# Patient Record
Sex: FEMALE | Race: BLACK OR AFRICAN AMERICAN | Employment: FULL TIME | URBAN - METROPOLITAN AREA
[De-identification: names, ages, dates, MRNs, and addresses within clinical notes are randomized per-mention and may not be internally consistent; named-entity substitution may affect disease eponyms.]

---

## 2018-01-23 PROBLEM — N80.9 ENDOMETRIOSIS DETERMINED BY LAPAROSCOPY: Status: ACTIVE | Noted: 2018-01-23

## 2021-06-16 ENCOUNTER — HOSPITAL ENCOUNTER (OUTPATIENT)
Dept: GENERAL RADIOLOGY | Age: 31
Discharge: HOME | End: 2021-06-16
Payer: OTHER GOVERNMENT

## 2021-06-16 DIAGNOSIS — R52 PAIN: ICD-10-CM

## 2021-06-16 PROCEDURE — 73562 X-RAY EXAM OF KNEE 3: CPT

## 2022-12-06 ENCOUNTER — TRANSCRIBE ORDER (OUTPATIENT)
Dept: REGISTRATION | Age: 32
End: 2022-12-06

## 2022-12-06 ENCOUNTER — HOSPITAL ENCOUNTER (OUTPATIENT)
Dept: GENERAL RADIOLOGY | Age: 32
Discharge: HOME | End: 2022-12-06
Payer: OTHER GOVERNMENT

## 2022-12-06 DIAGNOSIS — M25.521 RIGHT ELBOW PAIN: ICD-10-CM

## 2022-12-06 DIAGNOSIS — M25.521 RIGHT ELBOW PAIN: Primary | ICD-10-CM

## 2022-12-06 PROCEDURE — 73080 X-RAY EXAM OF ELBOW: CPT

## 2022-12-12 ENCOUNTER — HOSPITAL ENCOUNTER (OUTPATIENT)
Dept: PHYSICAL THERAPY | Age: 32
Discharge: HOME | End: 2022-12-12
Payer: OTHER GOVERNMENT

## 2022-12-12 PROCEDURE — 97161 PT EVAL LOW COMPLEX 20 MIN: CPT

## 2022-12-12 PROCEDURE — 97110 THERAPEUTIC EXERCISES: CPT

## 2022-12-12 NOTE — PROGRESS NOTES
PT DAILY TREATMENT NOTE - [facility]     Patient Name: Maryam Roth  Date:2022  : 1990  [x]  Patient  Verified  Payor:  / Plan: Kaylyn Palmer / Product Type:  /    In time:333  Out time:418  Total Treatment Time (min): 39  TVisit #: 1 of     Treatment Area: Pain in right elbow [M25.521]    SUBJECTIVE  Pain Level (0-10 scale): 6-7/10  Any medication changes, allergies to medications, adverse drug reactions, diagnosis change, or new procedure performed?: [x] No    [] Yes (see summary sheet for update)  Subjective functional status/changes:   [] No changes reported  See eval    OBJECTIVE    Modality rationale: decrease pain and increase tissue extensibility to improve the patients ability to ease with adl's   Min Type Additional Details    [] Estim:  []Unatt       []IFC  []Premod                        []Other:  []w/ice   []w/heat  Position:  Location:    [] Estim: []Att    []TENS instruct  []NMES                    []Other:  []w/US   []w/ice   []w/heat  Position:  Location:    []  Traction: [] Cervical       []Lumbar                       [] Prone          []Supine                       []Intermittent   []Continuous Lbs:  [] before manual  [] after manual    []  Ultrasound: []Continuous   [] Pulsed                           []1MHz   []3MHz W/cm2:  Location:    []  Iontophoresis with dexamethasone         Location: [] Take home patch   [] In clinic    []  Ice     []  heat  []  Ice massage  []  Laser   []  Anodyne Position:  Location:    []  Laser with stim  []  Other:  Position:  Location:    []  Vasopneumatic Device Pressure:       [] lo [] med [] hi   Temperature: [] lo [] med [] hi   [] Skin assessment post-treatment:  []intact []redness- no adverse reaction    []redness - adverse reaction:     15 min []Eval                  []Re-Eval       25 min Therapeutic Exercise:  [] See flow sheet :   Rationale: increase ROM and increase strength to improve the patients ability to ease with adl's          With   [] TE   [] TA   [] neuro   [] other: Patient Education: [x] Review HEP    [] Progressed/Changed HEP based on:   [] positioning   [] body mechanics   [] transfers   [] heat/ice application    [] other:      Other Objective/Functional Measures: see eval     Pain Level (0-10 scale) post treatment: 6-7/10    ASSESSMENT/Changes in Function: see poc    Patient will continue to benefit from skilled PT services to modify and progress therapeutic interventions, address functional mobility deficits, address ROM deficits, address strength deficits, analyze and address soft tissue restrictions, analyze and cue movement patterns, analyze and modify body mechanics/ergonomics, assess and modify postural abnormalities, and address imbalance/dizziness to attain remaining goals.      [x]  See Plan of Care  []  See progress note/recertification  []  See Discharge Summary         Progress towards goals / Updated goals:  See poc    PLAN    [x]Upgrade activities as tolerated     [x]  Continue plan of care  []  Update interventions per flow sheet       []  Discharge due to:_  []  Other:_      Alena Bowers PT 12/12/2022  2:40 PM    Future Appointments   Date Time Provider Thea Simmons   12/12/2022  3:30 PM Saeid White PT [facility]

## 2022-12-12 NOTE — THERAPY EVALUATION
[facility] [place] [employer]  [address] [place] [address]  [phone] fax    Plan of Care/ Statement of Necessity for Physical Therapy Services    Patient name: Carrol Ferrer Start of Care: 2022   Referral source: Ruchi Cox MD : 1990    Medical Diagnosis: Pain in right elbow [M25.521]  Payor:  / Plan: Ramon Hanks 74 / Product Type:  /  Onset Date:2022    Treatment Diagnosis: Right elbow pain   Prior Hospitalization: see medical history Provider#: [id number]   Medications: Verified on Patient summary List    Comorbidities: Depression   Prior Level of Function: Works in [employer]. Likes to work out and lift weights at gym. The Plan of Care and following information is based on the information from the initial evaluation. Assessment/ key information: Pt report insidious onset of right elbow pain and numbness of 4th and 5th digits, 2022. She had little relief with Medrol Dose pack. Pain today is 6-7/10 and is aggravated with certain movements and includes numbness into 4th and 5th digits. Elbow and wrist ROM is WNL. Pt reports pain along the Cubital Tunnel and into flexor tendons. Elbow extension wrist flexion increased pain. Most right UE activity aggravates her elbow especially when fully flexed as well. Sx are associated with Cubital Tunnel syndrome. Pt is recommended to hold on lifting free weights at gym until sx subside. Pt will benefit from  skilled PT to improve sx to return to PLOF and improve QOL.      Evaluation Complexity History LOW Complexity : Zero comorbidities / personal factors that will impact the outcome / POC; Examination LOW Complexity : 1-2 Standardized tests and measures addressing body structure, function, activity limitation and / or participation in recreation  ;Presentation LOW Complexity : Stable, uncomplicated  ;Clinical Decision Making MEDIUM Complexity : FOTO score of 26-74  Overall Complexity Rating: LOW   Problem List: pain affecting function, decrease ROM, decrease strength, decrease ADL/ functional abilitiies, decrease activity tolerance, and decrease flexibility/ joint mobility   Treatment Plan may include any combination of the following: Therapeutic exercise, Neuromuscular reeducation, Manual therapy, Therapeutic activity, Self care/home management, Vasopneumatic device, and Ultrasound  Patient / Family readiness to learn indicated by: asking questions, trying to perform skills, and interest  Persons(s) to be included in education: patient (P)  Barriers to Learning/Limitations: None  Patient Goal (s): To figure out what the issue is  Patient Self Reported Health Status: fair  Rehabilitation Potential: good    Short Term Goals: To be accomplished in 1 weeks:   Goal:Pt will be compliant with a HEP to improve elbow function  Status at evaluation/last progress note:  Long Term Goals: To be accomplished in 4 weeks:  Goal:Pt will increase FOTO scr by 7   pts to improve function  Status at evaluation/last progress note:NA at Eval    2. Goal:Pt will report pain decreased to <4/10 to ease with driving activities. Status at evaluation/last progress note:Pain increases with driving and is at 6-5/42     3. Goal: Pt will report >70% improvement in sx to be able to gradually return to gym activities. Status at evaluation/last progress note:0% currently. Frequency / Duration: Patient to be seen 2-3 times per week for 4 weeks. Patient/  Caregiver education and instruction: Diagnosis, prognosis, self care, activity modification, and exercises   [x]  Plan of care has been reviewed with SONDRA Sims, PT 12/12/2022 3:36 PM    ________________________________________________________________________    I certify that the above Therapy Services are being furnished while the patient is under my care.  I agree with the treatment plan and certify that this therapy is necessary.     [facility] Signature:____________Date:_________TIME:________     Cheyenne Vega MD  ** Signature, Date and Time must be completed for valid certification **    Please sign and return to [facility] - [facility]ord[facility]  [address] [place] [address]  [phone] fax

## 2022-12-20 ENCOUNTER — HOSPITAL ENCOUNTER (OUTPATIENT)
Dept: PHYSICAL THERAPY | Age: 32
Discharge: HOME | End: 2022-12-20
Payer: OTHER GOVERNMENT

## 2022-12-20 PROCEDURE — 97110 THERAPEUTIC EXERCISES: CPT

## 2022-12-20 PROCEDURE — 97530 THERAPEUTIC ACTIVITIES: CPT

## 2022-12-20 NOTE — PROGRESS NOTES
PT DAILY TREATMENT NOTE     Patient Name: Dian Helton  Date:2022  : 1990  [x]  Patient  Verified  Payor: MIKEY / Plan: Ramon Hanks 74 / Product Type:  /    In time:4:03P  Out time:4:38P  Total Treatment Time (min): 35  Visit #: 2 of 12      Treatment Area: Pain in right elbow [M25.521]    SUBJECTIVE  Pain Level (0-10 scale): 0/10 pain, 7/10 numbness  Any medication changes, allergies to medications, adverse drug reactions, diagnosis change, or new procedure performed?: [x] No    [] Yes (see summary sheet for update)  Subjective functional status/changes:   [] No changes reported  Patient reports continued intermittent numbness and pain to right UE; pain and numbness are about 50/50. She has a referral for an x-ray; she may go tomorrow. Sometimes the home exercises hurt and sometimes they don't. Most of the time she has symptoms from the elbow down but sometimes she will have shooting pain from the neck down.      OBJECTIVE        12 min Therapeutic Exercise:  [] See flow sheet :   Rationale: increase ROM, increase strength, and increase proprioception to improve the patients ability to perform ADLs with increased ease    23 min Therapeutic Activity:  []  See flow sheet : FOTO, patient education, special tests   Rationale: increase ROM, increase strength, and increase proprioception  to improve the patients ability to perform ADLs with increased ease             With   [] TE   [] TA   [] neuro   [] other: Patient Education: [x] Review HEP    [] Progressed/Changed HEP based on:   [] positioning   [] body mechanics   [] transfers   [] heat/ice application    [x] other: anatomy and physiology as it relates to current condition; centralization as sign of improvement even symptoms intensify while centralizing; importance of posture to reduce muscle tightness and nerve compression     Other Objective/Functional Measures:     Pain with forward direction on UBE; soreness reported with reverse direction  FOTO: 50/100  Discomfort to right elbow with \"Urmila Basilio\" ulnar nerve glide   Spurling test positive on right; no change in symptoms with distraction    Pain Level (0-10 scale) post treatment: 6/10 numbness to palm    ASSESSMENT/Changes in Function: Initiated activities per flow sheet/POC. Patient with fair overall tolerance to session. She reports continued anterior and posterior elbow pain with intermittent numbness/tingling in ulnar nerve distribution. Positive Spurling's test likely indicating cervical involvement but centralization of symptoms with ulnar nerve glide. Patient will continue to benefit from skilled PT services to modify and progress therapeutic interventions, address functional mobility deficits, address ROM deficits, address strength deficits, analyze and address soft tissue restrictions, analyze and cue movement patterns, analyze and modify body mechanics/ergonomics, assess and modify postural abnormalities, address imbalance/dizziness, and instruct in home and community integration to attain remaining goals. Progress towards goals / Updated goals:  Short Term Goals: To be accomplished in 1 weeks:               Goal:Pt will be compliant with a HEP to improve elbow function  Status at evaluation/last progress note:  Long Term Goals: To be accomplished in 4 weeks:  Goal:Pt will increase FOTO scr by 7   pts to improve function  Status at evaluation/last progress note:NA at Eval     2. Goal:Pt will report pain decreased to <4/10 to ease with driving activities. Status at evaluation/last progress note:Pain increases with driving and is at 2-6/42     3. Goal: Pt will report >70% improvement in sx to be able to gradually return to gym activities. Status at evaluation/last progress note:0% currently.                        PLAN  [x]  Upgrade activities as tolerated     [x]  Continue plan of care  []  Update interventions per flow sheet       [] Discharge due to:_  []  Other:_      Audra Wells, PT 12/20/2022  10:23 AM    Future Appointments   Date Time Provider Thea Simmons   12/20/2022  4:00 PM Rommel Greene [id number]   12/22/2022  3:30 PM Rommel Greene [id number]   12/28/2022  4:00 PM Rommel Greene [id number]

## 2022-12-22 ENCOUNTER — HOSPITAL ENCOUNTER (OUTPATIENT)
Dept: PHYSICAL THERAPY | Age: 32
Discharge: HOME | End: 2022-12-22
Payer: OTHER GOVERNMENT

## 2022-12-22 PROCEDURE — 97110 THERAPEUTIC EXERCISES: CPT

## 2022-12-22 PROCEDURE — 97112 NEUROMUSCULAR REEDUCATION: CPT

## 2022-12-22 NOTE — PROGRESS NOTES
PT DAILY TREATMENT NOTE     Patient Name: Mor Vale  Date:2022  : 1990  [x]  Patient  Verified  Payor: MIKEY / Plan: Ramon Hanks 74 / Product Type:  /    In time:3:30P  Out time:4:06P  Total Treatment Time (min): 36  Visit #: 3 of 12      Treatment Area: Pain in right elbow [M25.521]    SUBJECTIVE  Pain Level (0-10 scale): 0/10 pain; 8/10 numbness  Any medication changes, allergies to medications, adverse drug reactions, diagnosis change, or new procedure performed?: [x] No    [] Yes (see summary sheet for update)  Subjective functional status/changes:   [] No changes reported  Patient reports numbness was not painful today. She was ok after last visit. She has been working on posture at work. Symptoms are down to DIP joint today. She feels relief from stretching at home. She is looking into a new job; she does discipline and firing.      OBJECTIVE    24 min Therapeutic Exercise:  [x] See flow sheet :   Rationale: increase ROM, increase strength, and increase proprioception to improve the patients ability to perform daily tasks with increased ease         12 min Neuromuscular Re-education:  [x]  See flow sheet : ulnar nerve glide, scapular and cervical retractions for posture   Rationale: increase ROM, increase strength, and increase proprioception  to improve the patients ability to perform daily tasks with increased ease              With   [] TE   [] TA   [] neuro   [] other: Patient Education: [x] Review HEP    [] Progressed/Changed HEP based on:   [] positioning   [] body mechanics   [] transfers   [] heat/ice application    [x] other: stress management to assist with symptom management/reduction     Other Objective/Functional Measures:      Painful cavitation at elbow with bicep curls  Discomfort and heaviness reported with bicep curls with palm down   No change in symptoms with ulnar nerve glide this visit  Increased scapular elevation with scap retractions improved with verbal and visual cues    Increased symptoms with doorway stretch     Reviewed not leaning on armrest at work or console when driving to reduce pressure to ulnar nerve    Pain Level (0-10 scale) post treatment: numbness-6/10    ASSESSMENT/Changes in Function: Patient reporting continued severe numbness worsened with stretches/activities and unchanged with nerve glide this visit. She continues to report and demonstrate impaired posture with sitting at work including resting right UE on armrest leading to compression of ulnar nerve. Plan to trial cervical distraction next visit. Patient will continue to benefit from skilled PT services to modify and progress therapeutic interventions, address functional mobility deficits, address ROM deficits, address strength deficits, analyze and address soft tissue restrictions, analyze and cue movement patterns, analyze and modify body mechanics/ergonomics, assess and modify postural abnormalities, address imbalance/dizziness, and instruct in home and community integration to attain remaining goals. Progress towards goals / Updated goals:  Short Term Goals: To be accomplished in 1 weeks:               Goal:Pt will be compliant with a HEP to improve elbow function  Status at evaluation/last progress note:  Long Term Goals: To be accomplished in 4 weeks:  Goal:Pt will increase FOTO scr by 7   pts to improve function  Status at evaluation/last progress note:NA at Eval     2. Goal:Pt will report pain decreased to <4/10 to ease with driving activities. Status at evaluation/last progress note:Pain increases with driving and is at 9-3/22     3. Goal: Pt will report >70% improvement in sx to be able to gradually return to gym activities. Status at evaluation/last progress note:0% currently.         PLAN  [x]  Upgrade activities as tolerated     [x]  Continue plan of care  []  Update interventions per flow sheet       []  Discharge due to:_  []  Other:_      [place] [employer] Rommel Hopkins 12/22/2022  10:31 AM    Future Appointments   Date Time Provider Thea Simmons   12/22/2022  3:30 PM Anika Terry [place] [id number]   12/28/2022  4:00 PM Anika Terry [place] [id number]

## 2022-12-28 ENCOUNTER — HOSPITAL ENCOUNTER (OUTPATIENT)
Dept: PHYSICAL THERAPY | Age: 32
Discharge: HOME | End: 2022-12-28
Payer: OTHER GOVERNMENT

## 2022-12-28 PROCEDURE — 97110 THERAPEUTIC EXERCISES: CPT

## 2022-12-28 PROCEDURE — 97530 THERAPEUTIC ACTIVITIES: CPT

## 2022-12-28 PROCEDURE — 97112 NEUROMUSCULAR REEDUCATION: CPT

## 2022-12-28 NOTE — PROGRESS NOTES
PT DAILY TREATMENT NOTE     Patient Name: Molly Jean-Baptiste  Date:2022  : 1990  [x]  Patient  Verified  Payor:  / Plan: Ramon Hanks 74 / Product Type:  /    In time:4:00P  Out time:4:36P  Total Treatment Time (min): 36  Visit #: 4 of 12        Treatment Area: Pain in right elbow [M25.521]    SUBJECTIVE  Pain Level (0-10 scale): 6-710   Any medication changes, allergies to medications, adverse drug reactions, diagnosis change, or new procedure performed?: [x] No    [] Yes (see summary sheet for update)  Subjective functional status/changes:   [] No changes reported  Patient reports sharp pain to lateral forearm today. She was doing things but noticed more pain than usual. She is supposed to get an x-ray of her shoulder. She has been working on her posture more and has been more aware of not putting her elbows on her armmrests. OBJECTIVE        13 min Therapeutic Exercise:  [] See flow sheet :   Rationale: increase ROM, increase strength, and increase proprioception to improve the patients ability to perform daily tasks with increased ease    8 min Therapeutic Activity:  []  See flow sheet : patient education   Rationale: increase ROM, increase strength, and increase proprioception  to improve the patients ability to perform daily tasks with increased ease       15 min Manual Therapy:  STM to right pec minor, UT, and levator scap; SOR, manual cervical distraction   The manual therapy interventions were performed at a separate and distinct time from the therapeutic activities interventions.   Rationale: decrease pain, increase ROM, increase tissue extensibility, and decrease trigger points to perform daily tasks with increased ease    \          With   [] TE   [] TA   [] neuro   [] other: Patient Education: [x] Review HEP    [] Progressed/Changed HEP based on:   [] positioning   [] body mechanics   [] transfers   [] heat/ice application    [] other:      Other Objective/Functional Measures:      Symptoms reduced to 4/10 following manual cervical distraction  Reviewed purpose of manual and mechanical traction   Educated on purpose of occupational therapy to address elbow and symptoms more distal especially if truly carpal tunnel  Max TTP to levator scap with hypertonicity noted    Pain Level (0-10 scale) post treatment: 4/10    ASSESSMENT/Changes in Function: Patient making fair progress towards goals at this time. She demonstrates symptom reduction with manual cervical traction indicating likelihood of cervical as well as elbow/wrist involvement. Plan to continue with manual interventions and progress with mechanical traction in future visit(s) pending no contraindications and continued relief with manual cervical traction. Patient will continue to benefit from skilled PT services to modify and progress therapeutic interventions, address functional mobility deficits, address ROM deficits, address strength deficits, analyze and address soft tissue restrictions, analyze and cue movement patterns, analyze and modify body mechanics/ergonomics, assess and modify postural abnormalities, address imbalance/dizziness, and instruct in home and community integration to attain remaining goals. Progress towards goals / Updated goals:  Short Term Goals: To be accomplished in 1 weeks:               Goal:Pt will be compliant with a HEP to improve elbow function  Status at evaluation/last progress note:  MET    Long Term Goals: To be accomplished in 4 weeks:  Goal:Pt will increase FOTO scr by 7   pts to improve function  Status at evaluation/last progress note:NA at [facility]  50/100-12/20/22     2. Goal:Pt will report pain decreased to <4/10 to ease with driving activities. Status at evaluation/last progress note:Pain increases with driving and is at 2-4/04  [id number]-5-2/10 at time of therapy sessions     3.    Goal: Pt will report >70% improvement in sx to be able to gradually return to gym activities. Status at evaluation/last progress note:0% currently.      PLAN  [x]  Upgrade activities as tolerated     [x]  Continue plan of care  []  Update interventions per flow sheet       []  Discharge due to:_  []  Other:_      Alexys Cotton, PT 12/28/2022  10:01 AM    Future Appointments   Date Time Provider Thea Simmons   12/28/2022  4:00 PM Anika Clearance Estrin, [place] [facility]   12/30/2022  1:30 PM Dianah Quest [facility]   1/3/2023  3:30 PM Dianah Quest [facility]   1/5/2023  5:30 PM Anika Clearance Estrin, PT [facility]   1/10/2023  4:30 PM Griselda Furl, PT [facility]   1/12/2023  4:00 PM Anika Clearance Estrin, PT [facility]

## 2022-12-30 ENCOUNTER — HOSPITAL ENCOUNTER (OUTPATIENT)
Dept: PHYSICAL THERAPY | Age: 32
End: 2022-12-30
Payer: OTHER GOVERNMENT

## 2022-12-30 PROCEDURE — 97530 THERAPEUTIC ACTIVITIES: CPT

## 2022-12-30 PROCEDURE — 97110 THERAPEUTIC EXERCISES: CPT

## 2022-12-30 PROCEDURE — 97140 MANUAL THERAPY 1/> REGIONS: CPT

## 2023-01-03 ENCOUNTER — HOSPITAL ENCOUNTER (OUTPATIENT)
Dept: PHYSICAL THERAPY | Age: 33
Discharge: HOME | End: 2023-01-03
Payer: OTHER GOVERNMENT

## 2023-01-03 ENCOUNTER — HOSPITAL ENCOUNTER (OUTPATIENT)
Dept: GENERAL RADIOLOGY | Age: 33
Discharge: HOME | End: 2023-01-03
Payer: OTHER GOVERNMENT

## 2023-01-03 ENCOUNTER — TRANSCRIBE ORDER (OUTPATIENT)
Dept: REGISTRATION | Age: 33
End: 2023-01-03

## 2023-01-03 DIAGNOSIS — M25.521 RIGHT ELBOW PAIN: ICD-10-CM

## 2023-01-03 DIAGNOSIS — M25.521 RIGHT ELBOW PAIN: Primary | ICD-10-CM

## 2023-01-03 PROCEDURE — 97112 NEUROMUSCULAR REEDUCATION: CPT

## 2023-01-03 PROCEDURE — 97035 APP MDLTY 1+ULTRASOUND EA 15: CPT

## 2023-01-03 PROCEDURE — 97110 THERAPEUTIC EXERCISES: CPT

## 2023-01-03 PROCEDURE — 72040 X-RAY EXAM NECK SPINE 2-3 VW: CPT

## 2023-01-03 NOTE — PROGRESS NOTES
PT DAILY TREATMENT NOTE     Patient Name: Mariola Carmen  Date:1/3/2023  : 1990  [x]  Patient  Verified  Payor: MIKEY / Plan: Ramon Hanks 74 / Product Type:  /    In time: 3:33  Out time: 4:00  Total Treatment Time (min): 27  Visit #: 6 of 12    Treatment Area: Pain in right elbow [M25.521]    SUBJECTIVE  Pain Level (0-10 scale): 6/10   Any medication changes, allergies to medications, adverse drug reactions, diagnosis change, or new procedure performed?: [x] No    [] Yes (see summary sheet for update)  Subjective functional status/changes:   [] No changes reported  Patient reports no change, she has a follow up with her doctor tomorrow. She has been doing self massage and using ice at home but has not seen any difference. OBJECTIVE    Modality rationale: decrease inflammation and decrease pain to improve the patients ability to perform ADLs with ease.    Min Type Additional Details    [] Estim: []Att   []Unatt  []TENS instruct                 []IFC  []Premod []NMES                       []Other:  []w/US   []w/ice   []w/heat  Position:  Location:    []  Traction: [] Cervical       []Lumbar                       [] Prone          []Supine                       []Intermittent   []Continuous Lbs:  [] before manual  [] after manual   8 [x]  Ultrasound: [x]Continuous   [] Pulsed                           [x]1MHz   []3MHz Location: right UT  W/cm2: 1.5    []  Iontophoresis with dexamethasone         Location: [] Take home patch   [] In clinic    []  Ice     []  heat  []  Ice massage Position:  Location:    []  Vasopneumatic Device:  If using vaso (only need to measure limb vaso being performed on)      pre-treatment girth :       post-treatment girth :       measured at (landmark location)  Pressure: [] lo [] med [] hi   Temp: [] lo [] med [] hi   [x] Skin assessment post-treatment:  [x]intact []redness- no adverse reaction       []redness - adverse reaction:      8 min Therapeutic Exercise:  [] See flow sheet :   Rationale: increase ROM, increase strength, and increase proprioception to improve the patients ability to perform daily tasks with increased ease. 11 min Neuromuscular Re-education:  [] See flow sheet :    Rationale: increase ROM, increase strength, and increase proprioception to improve the patients ability to perform daily tasks with increased ease. H min Manual Therapy:  STM to right pec minor, UT, and levator scap; SOR, manual cervical distraction   The manual therapy interventions were performed at a separate and distinct time from the therapeutic activities interventions. Rationale: decrease pain, increase ROM, increase tissue extensibility, and decrease trigger points to perform daily tasks with increased ease          With   [x] TE   [] TA   [] neuro   [] other: Patient Education: [x] Review HEP    [x] Progressed/Changed HEP based on:   [] positioning   [] body mechanics   [] transfers   [] heat/ice application    [] other:      Other Objective/Functional Measures:   - trial of US to right UT today  - held nerve glides d/t increased sx last session  - held MT d/t poor tolerance last session    Pain Level (0-10 scale) post treatment: 6/10    ASSESSMENT/Changes in Function:   Pt agreeable to trial of US today however reports no change in pain and sx after. Performed therex to tolerance holding all therex that increased pain and sx last session. Pt has follow up with MD tomorrow and is hoping they will schedule an MRI.      Patient will continue to benefit from skilled PT services to modify and progress therapeutic interventions, address functional mobility deficits, address ROM deficits, address strength deficits, analyze and address soft tissue restrictions, analyze and cue movement patterns, analyze and modify body mechanics/ergonomics, assess and modify postural abnormalities, address imbalance/dizziness, and instruct in home and community integration to attain remaining goals. Progress towards goals / Updated goals:   Short Term Goals: To be accomplished in 1 weeks:               Goal:Pt will be compliant with a HEP to improve elbow function  Status at evaluation/last progress note:  MET    Long Term Goals: To be accomplished in 4 weeks:  Goal:Pt will increase FOTO scr by 7   pts to improve function  Status at evaluation/last progress note:NA at [facility]  50/100-12/20/22     2. Goal:Pt will report pain decreased to <4/10 to ease with driving activities. Status at evaluation/last progress note:Pain increases with driving and is at 0-4/73  Kzquwyqvccm-5-4/10 at time of therapy sessions     3. Goal: Pt will report >70% improvement in sx to be able to gradually return to gym activities. Status at evaluation/last progress note:0% currently.    Current: 15%, has more good days than before (12/30/22)    PLAN  [x]  Upgrade activities as tolerated     [x]  Continue plan of care  []  Update interventions per flow sheet       []  Discharge due to:_  []  Other:_      Steve Fraser, SONDRA 1/3/2023  4:00 PM    Future Appointments   Date Time Provider Thea Simmons   1/3/2023  3:30 PM Britta Watkins [facility]   1/5/2023  5:30 PM Anika Clearance Estrin, PT [id number] [facility]   1/10/2023  4:30 PM Griselda Furl, PT [facility]   1/12/2023  4:00 PM Anika Clearance Estrin, PT [id number] [facility]

## 2023-01-05 ENCOUNTER — HOSPITAL ENCOUNTER (OUTPATIENT)
Dept: PHYSICAL THERAPY | Age: 33
Discharge: HOME | End: 2023-01-05
Payer: OTHER GOVERNMENT

## 2023-01-05 PROCEDURE — 97530 THERAPEUTIC ACTIVITIES: CPT

## 2023-01-05 PROCEDURE — 97110 THERAPEUTIC EXERCISES: CPT

## 2023-01-05 NOTE — PROGRESS NOTES
PT DAILY TREATMENT NOTE     Patient Name: Brigitte Mcneill  Date:2023  : 1990  [x]  Patient  Verified  Payor: MIKEY / Plan: Ramon Hanks 74 / Product Type:  /    In time:5:31P  Out time:5:59P  Total Treatment Time (min): 28  Visit #: 7 of 12      Treatment Area: Pain in right elbow [M25.521]    SUBJECTIVE  Pain Level (0-10 scale): 7/10  Any medication changes, allergies to medications, adverse drug reactions, diagnosis change, or new procedure performed?: [x] No    [] Yes (see summary sheet for update)  Subjective functional status/changes:   [] No changes reported  Patient reports she went to doctor yesterday. She had some notes for the x-ray. She was told her muscles started to spasm; she did not feel her muscles spasm but felt her hand go limp. Her doctor wants her to have an ultrasound. She does not want to take muscle relaxers. The x-rays overall were normal. She still has good days and bad days. Her symptoms are just in the forearm (between elbow and wrist). Her neck muscles are still tense; she has a lot of tenderness. She has heating pads at night.      OBJECTIVE    20 min Therapeutic Exercise:  [] See flow sheet :   Rationale: increase ROM, increase strength, and increase proprioception to improve the patients ability to perform daily tasks with increased ease    8 min Therapeutic Activity:  []  See flow sheet : patient education, assessment of forearm muscle tonicity   Rationale: increase ROM and increase strength  to improve the patients ability to perform daily tasks with increased ease             With   [] TE   [] TA   [] neuro   [] other: Patient Education: [x] Review HEP    [] Progressed/Changed HEP based on:   [] positioning   [] body mechanics   [] transfers   [] heat/ice application    [x] other: muscle tightness contributing to nerve compression; POC including holding with PT after next week if no significant progress noted/symptoms continue to fluctuate; Other Objective/Functional Measures:     Heaviness reported with bicep curls    Trigger points and hypertonicity to ECR group      Pain Level (0-10 scale) post treatment: 6/10    ASSESSMENT/Changes in Function: Patient continues to report mod to severe pain levels at time of therapy sessions. She does not improvement in area of symptoms present only from lateral elbow to forearm this visit. Patient with trigger points to wrist extensor muscle group likely contributing to nerve compression and overall symptoms. She continues to report tightness to cervical and parascapular muscles and was re-educated on importance of posture, stretching, and stress management to help reduce muscle tension. Patient will continue to benefit from skilled PT services to modify and progress therapeutic interventions, address functional mobility deficits, address ROM deficits, address strength deficits, analyze and address soft tissue restrictions, analyze and cue movement patterns, analyze and modify body mechanics/ergonomics, assess and modify postural abnormalities, and instruct in home and community integration to attain remaining goals. Progress towards goals / Updated goals:  Short Term Goals: To be accomplished in 1 weeks:               Goal:Pt will be compliant with a HEP to improve elbow function  Status at evaluation/last progress note:  MET     Long Term Goals: To be accomplished in 4 weeks:  Goal:Pt will increase FOTO scr by 7   pts to improve function  Status at evaluation/last progress note:NA at [facility]  50/100-12/20/22     2. Goal:Pt will report pain decreased to <4/10 to ease with driving activities. Status at evaluation/last progress note:Pain increases with driving and is at 2-9/84  Gsfplmweyxu-4-3/10 at time of therapy sessions     3. Goal: Pt will report >70% improvement in sx to be able to gradually return to gym activities. Status at evaluation/last progress note:0% currently.    Current: 15%, has more good days than before (12/30/22)    PLAN  [x]  Upgrade activities as tolerated     [x]  Continue plan of care  []  Update interventions per flow sheet       []  Discharge due to:_  []  Other:_      Brook Epley, PT 1/5/2023  10:40 AM    Future Appointments   Date Time Provider Thea Simmons   1/5/2023  5:30 PM Anika Salians, PT [facility]   1/10/2023  4:30 PM Stefan Low, PT [facility]   1/12/2023  4:00 PM Anika Salinas, PT [facility]

## 2023-01-10 ENCOUNTER — HOSPITAL ENCOUNTER (OUTPATIENT)
Dept: PHYSICAL THERAPY | Age: 33
Discharge: HOME | End: 2023-01-10
Payer: OTHER GOVERNMENT

## 2023-01-10 PROCEDURE — 97530 THERAPEUTIC ACTIVITIES: CPT

## 2023-01-10 NOTE — THERAPY RECERTIFICATION
[facility]  [address] [place] [address]  [phone] fax    Physical Therapy Progress Note  Patient name: Mary Kay Banks Start of Care: 2022   Referral source: Kunal Saldaña MD : 1990   Medical/Treatment Diagnosis: Pain in right elbow [M25.521]  Payor:  / Plan: Ramon Hanks 74 / Product Type:  /  Onset Date:2022     Prior Hospitalization: see medical history Provider#: [id number]   Medications: Verified on Patient Summary List    Comorbidities: Depression  Prior Level of Function:Works in [employer]. Likes to work out and lift weights at gym. Visits from Start of Care: 8    Missed Visits: 0    Established Goals:         Excellent           Good         Limited           None  [] Increased ROM   []  []  []  []  [x] Increased Strength  []  []  [x]  []  [x] Increased Mobility  []  []  [x]  []   [x] Decreased Pain   []  []  [x]  []  [] Decreased Swelling  []  []  []  []    Key Functional Changes: 15% subjective improvement, 1 point FOTO score increased, severe pain at max    Updated Goals: to be achieved in 4 weeks:  1. Patient will improve FOTO score by to at least 65/100 points in order to demonstrate functional improvement. Status at last progress note: 51/100  2. Patient will report no more than \"little difficulty\" with \"Carrying a shopping bag or briefcase\" in order to demonstrate improved tolerance to daily tasks. Status at last progress note: \"moderate difficulty\"  3. Patient will report no greater than 5/10 pain to right forearm and hand in order to perform ADLs and driving with increased ease. Status at last progress note: 10/10 at max  4. Patient will report at least 50% improvement since start of care in order to demonstrate improved overall QOL. Status at last progress note: 15%  5.  Patient will improve right  strength by at least 10 pounds in order to perform functional tasks with increased ease.   Status at last progress note: 38#-average of 2 trials (left 62#)      ASSESSMENT/RECOMMENDATIONS: Patient making slow, steady progress towards goals in therapy. She reports pain down to a 3/10 at best but continued severe pain levels at max. Symptoms at worse remain sharp/shooting but are numb/achy at best. Symptoms remain in ulnar nerve distribution from lateral elbow to medial two fingers. FOTO score has improved 1 point to 50/100. Right  strength is impaired vs left. She demonstrates decreased right shoulder flex and IR strength along with diminished right elbow flex/ext and wrist ext strength (with pain increased with IR, elbow, and wrist ext MMT). She continues with max TTP to right UT and right forearm/wrist extensor group with trigger points/hypertoncity to areas. She has experienced intermittent relief with manual cervical distraction with poor carryover. She reports greatest relief with stretching and repeated wrist motions and has been educated on importance of posture/positioning, stretching, and stress management to help reduce muscle tension. Continuation with skilled outpatient PT pending physician recommendations following upcoming appt/imaging. Should patient continue with therapy, she would benefit to address strength, mobility, posture, stretching, and symptom reduction/management to improve tolerance to daily tasks and improve overall QOL.       UE MMT: date of progress note   Right  Left  Pain   Shoulder flex 4+ 4+    Shoulder abd 5 5    Shoulder ER 5 5    Shoulder IR 4 4+ Yes   Elbow flex 4 5 Yes   Elbow ext 4+ 5 Yes   Wrist flex 5 5    Wrist ext 4 4 Yes         [x]Continue therapy at a frequency of  2 x per week for 4 weeks  []Continue therapy with the following recommended changes:_____________________      _____________________________________________________________________  []Discontinue therapy progressing towards or have reached established goals  []Discontinue therapy due to lack of appreciable progress towards goals  []Discontinue therapy due to lack of attendance or compliance  [x]Await Physician's recommendations/decisions regarding therapy  []Other:________________________________________________________________    Thank you for this referral.   Yudith Colmenares, PT 1/10/2023 2:09 PM    NOTE TO PHYSICIAN:  PLEASE COMPLETE THE ORDERS BELOW AND   FAX TO [facility]: [phone]  If you are unable to process this request in 24 hours please contact our office: [phone]    I have read the above report and request that my patient continue as recommended. I have read the above report and request that my patient continue therapy with the following changes/special instructions:____________________________________  I have read the above report and request that my patient be discharged from therapy.     Physicians signature: ______________________________Date: ______Time:______     Tommy Lombardo MD

## 2023-01-10 NOTE — PROGRESS NOTES
PT DAILY TREATMENT NOTE     Patient Name: Rivera Lainez  Date:1/10/2023  : 1990  [x]  Patient  Verified  Payor:  / Plan: [facility]  [facility] / Product Type:  /    In time:1:34P  Out time:3:02p  Total Treatment Time (min): 28  Visit #: 8 of 12      Treatment Area: Pain in right elbow [M25.521]    SUBJECTIVE  Pain Level (0-10 scale): 3/10  Any medication changes, allergies to medications, adverse drug reactions, diagnosis change, or new procedure performed?: [x] No    [] Yes (see summary sheet for update)  Subjective functional status/changes:   [] No changes reported  Patient reports symptoms are just in her hands today to her same 2 fingers. It is numb. She was fine after last visit. Symptoms have just been to forearm and hand. She had sharp, shooting pains yesterday. She goes back to MD today; she is going to have an US at 4. OBJECTIVE      28 min Therapeutic Activity:  []  See flow sheet : FOTO, goal assessment, patient eduction   Rationale: increase ROM, increase strength, and increase proprioception  to improve the patients ability to perform ADLs with increased ease and determine therapy plan             With   [] TE   [] TA   [] neuro   [] other: Patient Education: [x] Review HEP    [] Progressed/Changed HEP based on:   [] positioning   [] body mechanics   [] transfers   [] heat/ice application    [x] other: nerve glide with repeated elbow or wrist flex/ext if symptoms only present in forearm; ergonomic mouse for work; review of sleeping posture; review of POC including ability to hold with PT for up to 30 days     Other Objective/Functional Measures:      FOTO:51/100  Pain at max: 10/10 (numbness 8/10)  Subjective improvement:    strength: right 43, 33, left 64, 60    Right UE MMT:  shoulder flex 4+  abd 5 ER 5 IR  4 elbow flex 4 ext 4+ wrist flex 5 ext 4 pain with Ir and elbow MMT  Left UE MMT: shoulder flex 4+ abd  5 ER 5 IR 4+ elbow flex 5  ext  5 wrist flex 5 ext 4     Pain Level (0-10 scale) post treatment: 4/10    ASSESSMENT/Changes in Function: SEE PROGRESS NOTE    Patient will continue to benefit from skilled PT services to modify and progress therapeutic interventions, address functional mobility deficits, address ROM deficits, address strength deficits, analyze and address soft tissue restrictions, analyze and cue movement patterns, analyze and modify body mechanics/ergonomics, assess and modify postural abnormalities, and instruct in home and community integration to attain remaining goals. []  See Plan of Care  []  See progress note/recertification  []  See Discharge Summary         Progress towards goals / Updated goals:    Short Term Goals: To be accomplished in 1 weeks:               Goal:Pt will be compliant with a HEP to improve elbow function  Status at evaluation/last progress note:  MET     Long Term Goals: To be accomplished in 4 weeks:  Goal:Pt will increase FOTO scr by 7   pts to improve function  Status at evaluation/last progress note:NA at [facility]  50/100-12/20/22  Progressing-51/100 1/10/12     2. Goal:Pt will report pain decreased to <4/10 to ease with driving activities. Status at evaluation/last progress note:Pain increases with driving and is at 3-0/74  Bfmhqzsfyef-1-0/10 at time of therapy sessions  Progressing-10/10 at max 1/10/23     3. Goal: Pt will report >70% improvement in sx to be able to gradually return to gym activities. Status at evaluation/last progress note:0% currently.    Current: 15%, has more good days than before (12/30/22)      PLAN  [x]  Upgrade activities as tolerated     [x]  Continue plan of care  []  Update interventions per flow sheet       []  Discharge due to:_  []  Other:_      Audra Wells, PT 1/10/2023  10:21 AM    Future Appointments   Date Time Provider Thea Simmons   1/10/2023  1:30 PM Rommel Greene [facility]   1/12/2023  4:00 PM Anika Avilez PT [facility]

## 2023-01-12 ENCOUNTER — APPOINTMENT (OUTPATIENT)
Dept: PHYSICAL THERAPY | Age: 33
End: 2023-01-12
Payer: OTHER GOVERNMENT

## 2023-01-12 NOTE — PROGRESS NOTES
PT DAILY TREATMENT NOTE     Patient Name: Emmanuelle Ramsey  Date:2023  : 1990  [x]  Patient  Verified  Payor:  / Plan: Ramon Hanks 74 / Product Type:  /    In time:***  Out time:***  Total Treatment Time (min): ***  Visit #: 1 of 8    Medicare/BCBS Only   Total Timed Codes (min):  *** 1:1 Treatment Time:  ***       Treatment Area: Pain in right elbow [M25.521]    SUBJECTIVE  Pain Level (0-10 scale): ***  Any medication changes, allergies to medications, adverse drug reactions, diagnosis change, or new procedure performed?: [x] No    [] Yes (see summary sheet for update)  Subjective functional status/changes:   [] No changes reported  ***    OBJECTIVE    Modality rationale: {[facility]20[facility]} to improve the patients ability to ***   Min Type Additional Details    [] Estim:  []Unatt       []IFC  []Premod                        []Other:  []w/ice   []w/heat  Position:  Location:    [] Estim: []Att    []TENS instruct  []NMES                    []Other:  []w/US   []w/ice   []w/heat  Position:  Location:    []  Traction: [] Cervical       []Lumbar                       [] Prone          []Supine                       []Intermittent   []Continuous Lbs:  [] before manual  [] after manual    []  Ultrasound: []Continuous   [] Pulsed                           []1MHz   []3MHz W/cm2:  Location:    []  Iontophoresis with dexamethasone         Location: [] Take home patch   [] In clinic    []  Ice     []  heat  []  Ice massage  []  Laser   []  Anodyne Position:  Location:    []  Laser with stim  []  Other:  Position:  Location:    []  Vasopneumatic Device    []  Right     []  Left  Pre-treatment girth:  Post-treatment girth:  Measured at (location):  Pressure:       [] lo [] med [] hi   Temperature: [] lo [] med [] hi   [] Skin assessment post-treatment:  []intact []redness- no adverse reaction    []redness - adverse reaction:     *** min []Eval                  []Re-Eval *** min Therapeutic Exercise:  [] See flow sheet :   Rationale: {BSHSI IMMOTION THER EX:05627:a} to improve the patients ability to ***    *** min Therapeutic Activity:  []  See flow sheet :   Rationale: {BSHSI IMMOTION THER EX:00308:a}  to improve the patients ability to ***     *** min Neuromuscular Re-education:  []  See flow sheet :   Rationale: {BSHSI IMMOTION THER EX:98896:a}  to improve the patients ability to ***    *** min Manual Therapy:  ***   The manual therapy interventions were performed at a separate and distinct time from the therapeutic activities interventions. Rationale: {BSHSI IMMOTION MANUAL THERAPY:71078:a} to ***    *** min Gait Training:  ___ feet with ___ device on level surfaces with ___ level of assist   Rationale:    *** min Self Care/Home Management: ***   Rationale: {BSHSI IMMOTION THER EX:49393:a}  to improve the patients ability to ***          With   [] TE   [] TA   [] neuro   [] other: Patient Education: [x] Review HEP    [] Progressed/Changed HEP based on:   [] positioning   [] body mechanics   [] transfers   [] heat/ice application    [] other:      Other Objective/Functional Measures: ***     Pain Level (0-10 scale) post treatment: ***    ASSESSMENT/Changes in Function: ***    Patient will continue to benefit from skilled PT services to {[facility]UC [facility] ASSESSMENT STATEMENTS:86908:a} to attain remaining goals. []  See Plan of Care  []  See progress note/recertification  []  See Discharge Summary         Progress towards goals / Updated goals:  1. Patient will improve FOTO score by to at least 65/100 points in order to demonstrate functional improvement. Status at last progress note: 51/100  2. Patient will report no more than \"little difficulty\" with \"Carrying a shopping bag or briefcase\" in order to demonstrate improved tolerance to daily tasks. Status at last progress note: \"moderate difficulty\"  3.  Patient will report no greater than 5/10 pain to right forearm and hand in order to perform ADLs and driving with increased ease. Status at last progress note: 10/10 at max  4. Patient will report at least 50% improvement since start of care in order to demonstrate improved overall QOL. Status at last progress note: 15%  5. Patient will improve right  strength by at least 10 pounds in order to perform functional tasks with increased ease.               Status at last progress note: 38#-average of 2 trials (left 62#)       PLAN  []  Upgrade activities as tolerated     []  Continue plan of care  []  Update interventions per flow sheet       []  Discharge due to:_  []  Other:_      Niall Lee PT 1/12/2023  10:26 AM    Future Appointments   Date Time Provider Thea Simmons   1/12/2023  4:00 PM Anika Pederson, PT [facility]